# Patient Record
(demographics unavailable — no encounter records)

---

## 2025-05-15 NOTE — PHYSICAL EXAM
[General Appearance - Alert] : alert [General Appearance - In No Acute Distress] : in no acute distress [Oriented To Time, Place, And Person] : oriented to person, place, and time [Impaired Insight] : insight and judgment were intact [Affect] : the affect was normal [Person] : oriented to person [Place] : oriented to place [Time] : oriented to time [Short Term Intact] : short term memory intact [Remote Intact] : remote memory intact [Span Intact] : the attention span was normal [Concentration Intact] : normal concentrating ability [Fluency] : fluency intact [Comprehension] : comprehension intact [Current Events] : adequate knowledge of current events [Past History] : adequate knowledge of personal past history [Vocabulary] : adequate range of vocabulary [Cranial Nerves Optic (II)] : visual acuity intact bilaterally,  pupils equal round and reactive to light [Cranial Nerves Oculomotor (III)] : extraocular motion intact [Cranial Nerves Trigeminal (V)] : facial sensation intact symmetrically [Cranial Nerves Facial (VII)] : face symmetrical [Cranial Nerves Vestibulocochlear (VIII)] : hearing was intact bilaterally [Cranial Nerves Accessory (XI - Cranial And Spinal)] : head turning and shoulder shrug symmetric [Cranial Nerves Hypoglossal (XII)] : there was no tongue deviation with protrusion [Motor Tone] : muscle tone was normal in all four extremities [Motor Strength] : muscle strength was normal in all four extremities [Involuntary Movements] : no involuntary movements were seen [No Muscle Atrophy] : normal bulk in all four extremities [Sensation Tactile Decrease] : light touch was intact [Abnormal Walk] : normal gait [Balance] : balance was intact [PERRL With Normal Accommodation] : pupils were equal in size, round, reactive to light, with normal accommodation [Extraocular Movements] : extraocular movements were intact [Full Visual Field] : full visual field [Over the Past 2 Weeks, Have You Felt Down, Depressed, or Hopeless?] : 1.) Over the past 2 weeks, have you felt down, depressed, or hopeless? No [Over the Past 2 Weeks, Have You Felt Little Interest or Pleasure Doing Things?] : 2.) Over the past 2 weeks, have you felt little interest or pleasure doing things? No [Past-pointing] : there was no past-pointing [Tremor] : no tremor present

## 2025-05-15 NOTE — HISTORY OF PRESENT ILLNESS
[de-identified] : Pt was having left eye twitching constant for two month. Had MRI/MRA revealed left extracranial left ICA aneurysm. Today 5/15/25 without any complaints accompanied by friend  PCP: Dr. Chevy Diggs [place]  Eye Ophthalmologist Dr. Violette KANG

## 2025-05-15 NOTE — HISTORY OF PRESENT ILLNESS
[de-identified] : Pt was having left eye twitching constant for two month. Had MRI/MRA revealed left extracranial left ICA aneurysm. Today 5/15/25 without any complaints accompanied by friend  PCP: Dr. Chevy Diggs [place]  Eye Ophthalmologist Dr. Violette KANG

## 2025-05-15 NOTE — ASSESSMENT
[FreeTextEntry1] : I, Dr. Zavala, personally performed the evaluation and management (E/M) services for this new patient who presents today with (a) new problem. That E/M includes conducting the examination, assessing all new/exacerbated conditions, and establishing a new plan of care. Today, my ACP, Beronica Navas, was here to observe my evaluation and management services for this new problem/exacerbated condition to be followed going forward.  PLAN: - MRA in 1 year